# Patient Record
Sex: FEMALE | Race: WHITE
[De-identification: names, ages, dates, MRNs, and addresses within clinical notes are randomized per-mention and may not be internally consistent; named-entity substitution may affect disease eponyms.]

---

## 2018-07-01 ENCOUNTER — HOSPITAL ENCOUNTER (EMERGENCY)
Dept: HOSPITAL 25 - ED | Age: 33
Discharge: HOME | End: 2018-07-01
Payer: COMMERCIAL

## 2018-07-01 VITALS — DIASTOLIC BLOOD PRESSURE: 101 MMHG | SYSTOLIC BLOOD PRESSURE: 132 MMHG

## 2018-07-01 DIAGNOSIS — R42: Primary | ICD-10-CM

## 2018-07-01 LAB
BASOPHILS # BLD AUTO: 0 10^3/UL (ref 0–0.2)
EOSINOPHIL # BLD AUTO: 0 10^3/UL (ref 0–0.6)
HCT VFR BLD AUTO: 45 % (ref 35–47)
HGB BLD-MCNC: 15.1 G/DL (ref 12–16)
LYMPHOCYTES # BLD AUTO: 1.2 10^3/UL (ref 1–4.8)
MCH RBC QN AUTO: 30 PG (ref 27–31)
MCHC RBC AUTO-ENTMCNC: 34 G/DL (ref 31–36)
MCV RBC AUTO: 88 FL (ref 80–97)
MONOCYTES # BLD AUTO: 0.5 10^3/UL (ref 0–0.8)
NEUTROPHILS # BLD AUTO: 6.5 10^3/UL (ref 1.5–7.7)
NRBC # BLD AUTO: 0 10^3/UL
NRBC BLD QL AUTO: 0.1
PLATELET # BLD AUTO: 242 10^3/UL (ref 150–450)
RBC # BLD AUTO: 5.07 10^6/UL (ref 4–5.4)
WBC # BLD AUTO: 8.3 10^3/UL (ref 3.5–10.8)

## 2018-07-01 PROCEDURE — 99282 EMERGENCY DEPT VISIT SF MDM: CPT

## 2018-07-01 PROCEDURE — 81003 URINALYSIS AUTO W/O SCOPE: CPT

## 2018-07-01 PROCEDURE — 70450 CT HEAD/BRAIN W/O DYE: CPT

## 2018-07-01 PROCEDURE — 86618 LYME DISEASE ANTIBODY: CPT

## 2018-07-01 PROCEDURE — 84443 ASSAY THYROID STIM HORMONE: CPT

## 2018-07-01 PROCEDURE — 84702 CHORIONIC GONADOTROPIN TEST: CPT

## 2018-07-01 PROCEDURE — 36415 COLL VENOUS BLD VENIPUNCTURE: CPT

## 2018-07-01 PROCEDURE — 85025 COMPLETE CBC W/AUTO DIFF WBC: CPT

## 2018-07-01 PROCEDURE — 80053 COMPREHEN METABOLIC PANEL: CPT

## 2018-07-01 NOTE — RAD
Indication: Numbness and dizziness.



CT of the brain was performed without IV contrast.



Ventricular structures are midline. No midline shift is noted. The extra-axial spaces are

unremarkable. There is no evidence of mass or hemorrhage. No other high or low density

lesions are identified.



IMPRESSION: No intracranial mass or hemorrhage is noted.

## 2018-07-01 NOTE — ED
Tahir BACA Natalie, scribed for Arnoldo Anderson MD on 07/01/18 at 1107 .





Dizziness





- HPI Summary


HPI Summary: 


The patient is a 33 y/[facility] F [facility]iness 

episodes lasting for a minutes with onset of two weeks. The dizziness is 

described as a feeling of being unsteady, and it worsens with quick movements. 

She also reports numbness in her face, which is more prominent on the right side

, and bilateral hands. The pt states she woke up two nights ago from a deep 

sleep to having a feeling of extreme dizziness and nausea, but the symptoms 

quickly resolved. Since this, she feels like she has lost her sense of smell 

and taste. There has not been any pain associated with the dizziness and 

numbness. She additionally c/o "pulsing" eyes and chills. She denies SOB, CP, 

dysuria, and fever. She visited her PCP three days after onset who advised her 

to eat and drink better, so she has been drinking more Gatorade than normal. 

She has not had any tick bites that she is aware of. There is no FHx of MS. She 

is 5 weeks postpartum. She does not currently have any numbness or dizziness in 

the ED.





- History Of Current Complaint


Chief Complaint: EDDizziness


Stated Complaint: DIZZINESS/FACIAL NUMBNESS


Time Seen by Provider: 07/01/18 10:09


Hx Obtained From: Patient


Onset/Duration: Still Present, Suddenly


Timing: Intermittent Episode Lasting - a few minutes


Severity Initially: Mild


Severity Currently: Mild


Character: Dizzy


Aggravating Factor(s): Other - quick movements


Alleviating Factor(s): Nothing


Associated Signs And Symptoms: Positive: Nausea, Change In Diet - drinks more 

Gatorade than normal, Chills, Other: - loss of sense of smell and taste, 

numbness in hands and face, unsteady feeling, "pulsing" eyes.  Negative: Chest 

Pain, SOB, Fever





- Allergies/Home Medications


Allergies/Adverse Reactions: 


 Allergies











Allergy/AdvReac Type Severity Reaction Status Date / Time


 


No Known Allergies Allergy   Verified 07/01/18 09:43














PMH/Surg Hx/FS Hx/Imm Hx


Endocrine/Hematology History: 


   Denies: Hx Diabetes


Cardiovascular History: 


   Denies: Hx Congenital Heart Disease





- Immunization History


Immunizations Up to Date: Yes


Infectious Disease History: No


Infectious Disease History: 


   Denies: Traveled Outside the [place] in Last 30 Days





- Family History


Known Family History: Positive: Other - NEGATIVE: MS





- Social History


Alcohol Use: None


Substance Use Type: Reports: None


Smoking Status (MU): Never Smoked Tobacco





Review of Systems


Positive: Chills, Other - dizziness, unsteady feeling.  Negative: Fever


Positive: Other - "pulsing" eyes


Positive: Other - loss of taste and smell


Negative: Shortness Of Breath, Cough


Positive: Nausea, Other - increase in Gatorade intake


Negative: dysuria


Neurological: Other - unsteady gait during dizzy spells


Positive: Numbness - in face and bilateral hands


All Other Systems Reviewed And Are Negative: Yes





Physical Exam





- Summary


Physical Exam Summary: 


Appearance: Well appearing, no pain distress


Skin: warm, dry, reflects adequate perfusion


Head/face: normal


Eyes: EOMI, SUZETTE


ENT: normal


Neck: supple, non-tender


Respiratory: CTA, breath sounds present


Cardiovascular: RRR, pulses symmetrical 


Abdomen: non-tender, soft


Bowel Sounds: present


Musculoskeletal: normal, strength/ROM intact


Neuro: normal, sensory motor intact, A&Ox3, normal finger to nose, normal 

Romberg's test, no ataxia, GCS: 15


Triage Information Reviewed: Yes


Vital Signs On Initial Exam: 


 Initial Vitals











Temp Pulse Resp BP Pulse Ox


 


 98.9 F   90   16   151/105   99 


 


 07/01/18 09:36  07/01/18 09:36  07/01/18 09:36  07/01/18 09:36  07/01/18 09:36











Vital Signs Reviewed: Yes





Diagnostics





- Vital Signs


 Vital Signs











  Temp Pulse Resp BP Pulse Ox


 


 07/01/18 09:36  98.9 F  90  16  151/105  99














- Laboratory


Lab Results: 


 Lab Results











  07/01/18 07/01/18 07/01/18 Range/Units





  11:05 11:05 11:10 


 


WBC  8.3    (3.5-10.8)  10^3/ul


 


RBC  5.07    (4.00-5.40)  10^6/ul


 


Hgb  15.1    (12.0-16.0)  g/dl


 


Hct  45    (35-47)  %


 


MCV  88    (80-97)  fL


 


MCH  30    (27-31)  pg


 


MCHC  34    (31-36)  g/dl


 


RDW  14    (10.5-15)  %


 


Plt Count  242    (150-450)  10^3/ul


 


MPV  7.1 L    (7.4-10.4)  um3


 


Neut % (Auto)  79.1    (38-83)  %


 


Lymph % (Auto)  14.4 L    (25-47)  %


 


Mono % (Auto)  5.7    (0-7)  %


 


Eos % (Auto)  0.5    (0-6)  %


 


Baso % (Auto)  0.3    (0-2)  %


 


Absolute Neuts (auto)  6.5    (1.5-7.7)  10^3/ul


 


Absolute Lymphs (auto)  1.2    (1.0-4.8)  10^3/ul


 


Absolute Monos (auto)  0.5    (0-0.8)  10^3/ul


 


Absolute Eos (auto)  0    (0-0.6)  10^3/ul


 


Absolute Basos (auto)  0    (0-0.2)  10^3/ul


 


Absolute Nucleated RBC  0    10^3/ul


 


Nucleated RBC %  0.1    


 


Sodium   139   (135-145)  mmol/L


 


Potassium   3.7   (3.5-5.0)  mmol/L


 


Chloride   105   (101-111)  mmol/L


 


Carbon Dioxide   24   (22-32)  mmol/L


 


Anion Gap   10   (2-11)  mmol/L


 


BUN   7   (6-24)  mg/dL


 


Creatinine   0.80   (0.51-0.95)  mg/dL


 


Est GFR ( Amer)   101.2   (>60)  


 


Est GFR (Non-Af Amer)   83.7   (>60)  


 


BUN/Creatinine Ratio   8.8   (8-20)  


 


Glucose   124 H   ()  mg/dL


 


Calcium   9.4   (8.6-10.3)  mg/dL


 


Total Bilirubin   0.40   (0.2-1.0)  mg/dL


 


AST   14   (13-39)  U/L


 


ALT   23   (7-52)  U/L


 


Alkaline Phosphatase   76   ()  U/L


 


Total Protein   7.6   (6.4-8.9)  g/dL


 


Albumin   4.3   (3.2-5.2)  g/dL


 


Globulin   3.3   (2-4)  g/dL


 


Albumin/Globulin Ratio   1.3   (1-3)  


 


TSH   1.15   (0.34-5.60)  mcIU/mL


 


Beta HCG, Quant   < 0.60   mIU/mL


 


Urine Color    Straw  


 


Urine Appearance    Clear  


 


Urine pH    8.0  (5-9)  


 


Ur Specific Gravity    1.004 L  (1.010-1.030)  


 


Urine Protein    Negative  (Negative)  


 


Urine Ketones    Negative  (Negative)  


 


Urine Blood    Negative  (Negative)  


 


Urine Nitrate    Negative  (Negative)  


 


Urine Bilirubin    Negative  (Negative)  


 


Urine Urobilinogen    Negative  (Negative)  


 


Ur Leukocyte Esterase    Negative  (Negative)  


 


Urine Glucose    Negative  (Negative)  











Result Diagrams: 


 07/01/18 11:05





 07/01/18 11:05


Lab Statement: Any lab studies that have been ordered have been reviewed, and 

results considered in the medical decision making process.





- CT


  ** Brain CT


CT Interpretation: No Acute Changes - No intracranial mass or hemorrhage is 

noted. ED physician has reviewed this report.


CT Interpretation Completed By: Radiologist





Re-Evaluation





- Re-Evaluation


  ** First Eval


Re-Evaluation Time: 11:45


Change: Unchanged


Comment: I spoke with the pt about CT results. She will be discharged home.





Dizzy Course/Dx





- Course


Course Of Treatment: Patient with subacute to chronic symptoms at this point.  

No symptoms at present.  CT scan is negative.  No neurologic findings.  Non-

ataxic.  Possibilities include Lyme, chronic Lyme, aspartamine tox, multiple 

sclerosis, etc.  Patient to follow up closely with primary care physician.





- Diagnoses


Differential Diagnosis/HQI/PQRI: Other - MS, Lyme, TIA, vertigo, labyrinthitis


Provider Diagnoses: 


 Dizziness








Discharge





- Sign-Out/Discharge


Documenting (check all that apply): Discharge/Admit/Transfer - Pt will be 

discharged home.





- Discharge Plan


Condition: Good


Disposition: HOME


Patient Education Materials:  Dizziness (ED)


Referrals: 


Wilfrid Orlando [Physician Assistant] - 


Additional Instructions: 


He will require additional workup for these symptoms.  Please call your doctor 

in the morning to follow-up.  Return if worse, fevers, new symptoms or other 

concerns.





- Billing Disposition and Condition


Condition: GOOD


Disposition: Home





The documentation as recorded by the Tahir narayan Natalie accurately reflects 

the service I personally performed and the decisions made by me, Arnoldo Anderson MD.